# Patient Record
Sex: FEMALE | URBAN - METROPOLITAN AREA
[De-identification: names, ages, dates, MRNs, and addresses within clinical notes are randomized per-mention and may not be internally consistent; named-entity substitution may affect disease eponyms.]

---

## 2023-06-05 ENCOUNTER — APPOINTMENT (RX ONLY)
Dept: URBAN - METROPOLITAN AREA CLINIC 17 | Facility: CLINIC | Age: 68
Setting detail: DERMATOLOGY
End: 2023-06-05

## 2023-06-05 DIAGNOSIS — D485 NEOPLASM OF UNCERTAIN BEHAVIOR OF SKIN: ICD-10-CM

## 2023-06-05 PROBLEM — D48.5 NEOPLASM OF UNCERTAIN BEHAVIOR OF SKIN: Status: ACTIVE | Noted: 2023-06-05

## 2023-06-05 PROCEDURE — ? OBSERVATION

## 2023-06-05 PROCEDURE — ? TREATMENT REGIMEN

## 2023-06-05 PROCEDURE — 99202 OFFICE O/P NEW SF 15 MIN: CPT

## 2023-06-05 PROCEDURE — ? COUNSELING

## 2023-06-05 ASSESSMENT — LOCATION DETAILED DESCRIPTION DERM: LOCATION DETAILED: RIGHT DORSAL GREAT TOE

## 2023-06-05 ASSESSMENT — LOCATION SIMPLE DESCRIPTION DERM: LOCATION SIMPLE: RIGHT GREAT TOE

## 2023-06-05 ASSESSMENT — LOCATION ZONE DERM: LOCATION ZONE: TOE

## 2023-06-05 NOTE — PROCEDURE: TREATMENT REGIMEN
Detail Level: Zone
Plan: Given location, I discussed pt seeing an orthopod/ podiatrist for surgical removal. Pt reports lesion is painful with walking and rubbing on shoes.